# Patient Record
Sex: FEMALE | Race: WHITE | Employment: FULL TIME | URBAN - METROPOLITAN AREA
[De-identification: names, ages, dates, MRNs, and addresses within clinical notes are randomized per-mention and may not be internally consistent; named-entity substitution may affect disease eponyms.]

---

## 2024-07-26 NOTE — PROGRESS NOTES
[facility] CARDIOLOGY  [address] [place] [address], [address]  [place] [address]  PHONE: [phone]        24        NAME:  Estela Luciano  : 1970  MRN: [id number]     CHIEF COMPLAINT:    Consultation      SUBJECTIVE:     55 yo female referred for palpitation. Random. Usually at rest. Racing. Lasts< 1min. May be once /min. No other sxs. More stress than normal due to parents health.    Phx:  Right breast cancer : right partial mastectomy,  ctx, xrt  Tahbso 2023  Dyslipidemia     Medications were all reviewed with the patient today and updated as necessary.   Current Outpatient Medications   Medication Sig    minoxidil (LONITEN) 10 MG tablet Take 0.5 tablets by mouth daily    minoxidil (ROGAINE) 2 % external solution Apply topically 2 times daily    exemestane (AROMASIN) 25 MG tablet Take 1 tablet by mouth daily    vitamin D3 (CHOLECALCIFEROL) 125 MCG (5000 UT) TABS tablet Take by mouth daily    Calcium Carb-Cholecalciferol (CALCIUM + D3 PO) Take by mouth    Omega-3 Fatty Acids (FISH OIL PO) Take by mouth    Multiple Vitamins-Minerals (HAIR SKIN NAILS PO) Take by mouth    Multiple Vitamin (MULTI VITAMIN PO) Take by mouth    FIBER COMPLETE PO Take by mouth     No current facility-administered medications for this visit.        Allergies   Allergen Reactions    Niacin Other (See Comments)           PHYSICAL EXAM:     Wt Readings from Last 3 Encounters:   24 77.6 kg (171 lb)     BP Readings from Last 3 Encounters:   24 130/84       /84   Pulse 88   Ht 1.676 m (5' 6\")   Wt 77.6 kg (171 lb)   BMI 27.60 kg/m²     Physical Exam  Vitals reviewed.   HENT:      Head: Normocephalic and atraumatic.   Eyes:      Extraocular Movements: Extraocular movements intact.      Pupils: Pupils are equal, round, and reactive to light.   Cardiovascular:      Rate and Rhythm: Normal rate.      Heart sounds: Normal heart sounds.   Pulmonary:      Effort: Pulmonary effort is normal.       Indication: Knee pain

 

Technique: 3 views of the right knee

 

Comparison: None

 

Findings: No suprapatellar effusion. No acute fractures. No dislocations. The joint

spaces are preserved

 

Impression: Negative

## 2024-07-29 ENCOUNTER — INITIAL CONSULT (OUTPATIENT)
Age: 54
End: 2024-07-29
Payer: COMMERCIAL

## 2024-07-29 VITALS
BODY MASS INDEX: 27.48 KG/M2 | HEART RATE: 88 BPM | SYSTOLIC BLOOD PRESSURE: 130 MMHG | DIASTOLIC BLOOD PRESSURE: 84 MMHG | HEIGHT: 66 IN | WEIGHT: 171 LBS

## 2024-07-29 DIAGNOSIS — R00.2 PALPITATION: Primary | ICD-10-CM

## 2024-07-29 PROCEDURE — 93000 ELECTROCARDIOGRAM COMPLETE: CPT | Performed by: INTERNAL MEDICINE

## 2024-07-29 PROCEDURE — 99214 OFFICE O/P EST MOD 30 MIN: CPT | Performed by: INTERNAL MEDICINE

## 2024-07-29 RX ORDER — MINOXIDIL 10 MG/1
5 TABLET ORAL DAILY
COMMUNITY

## 2024-07-29 RX ORDER — EXEMESTANE 25 MG/1
25 TABLET ORAL DAILY
COMMUNITY
Start: 2023-12-11